# Patient Record
Sex: MALE | Race: OTHER | Employment: OTHER | URBAN - METROPOLITAN AREA
[De-identification: names, ages, dates, MRNs, and addresses within clinical notes are randomized per-mention and may not be internally consistent; named-entity substitution may affect disease eponyms.]

---

## 2023-02-26 ENCOUNTER — APPOINTMENT (OUTPATIENT)
Dept: CT IMAGING | Age: 48
End: 2023-02-26
Attending: EMERGENCY MEDICINE
Payer: COMMERCIAL

## 2023-02-26 ENCOUNTER — HOSPITAL ENCOUNTER (EMERGENCY)
Age: 48
Discharge: HOME | End: 2023-02-26
Attending: EMERGENCY MEDICINE
Payer: COMMERCIAL

## 2023-02-26 VITALS
HEART RATE: 98 BPM | BODY MASS INDEX: 28.61 KG/M2 | OXYGEN SATURATION: 99 % | SYSTOLIC BLOOD PRESSURE: 154 MMHG | WEIGHT: 204.37 LBS | HEIGHT: 71 IN | DIASTOLIC BLOOD PRESSURE: 99 MMHG | RESPIRATION RATE: 16 BRPM | TEMPERATURE: 98.7 F

## 2023-02-26 DIAGNOSIS — N17.9 AKI (ACUTE KIDNEY INJURY) (HCC): ICD-10-CM

## 2023-02-26 DIAGNOSIS — R19.7 VOMITING AND DIARRHEA: Primary | ICD-10-CM

## 2023-02-26 DIAGNOSIS — R11.10 VOMITING AND DIARRHEA: Primary | ICD-10-CM

## 2023-02-26 DIAGNOSIS — R73.9 HYPERGLYCEMIA: ICD-10-CM

## 2023-02-26 LAB
ALBUMIN SERPL-MCNC: 3.5 G/DL (ref 3.5–5)
ALBUMIN/GLOB SERPL: 0.8 (ref 1.1–2.2)
ALP SERPL-CCNC: 143 U/L (ref 45–117)
ALT SERPL-CCNC: 24 U/L (ref 12–78)
ANION GAP SERPL CALC-SCNC: 16 MMOL/L (ref 5–15)
AST SERPL-CCNC: 14 U/L (ref 15–37)
BASOPHILS # BLD: 0 K/UL (ref 0–0.1)
BASOPHILS NFR BLD: 0 % (ref 0–1)
BILIRUB SERPL-MCNC: 0.6 MG/DL (ref 0.2–1)
BUN SERPL-MCNC: 21 MG/DL (ref 6–20)
BUN/CREAT SERPL: 14 (ref 12–20)
CALCIUM SERPL-MCNC: 9.4 MG/DL (ref 8.5–10.1)
CHLORIDE SERPL-SCNC: 96 MMOL/L (ref 97–108)
CO2 SERPL-SCNC: 25 MMOL/L (ref 21–32)
CREAT SERPL-MCNC: 1.53 MG/DL (ref 0.7–1.3)
DIFFERENTIAL METHOD BLD: NORMAL
EOSINOPHIL # BLD: 0 K/UL (ref 0–0.4)
EOSINOPHIL NFR BLD: 1 % (ref 0–7)
ERYTHROCYTE [DISTWIDTH] IN BLOOD BY AUTOMATED COUNT: 13.5 % (ref 11.5–14.5)
GLOBULIN SER CALC-MCNC: 4.5 G/DL (ref 2–4)
GLUCOSE SERPL-MCNC: 310 MG/DL (ref 65–100)
HCT VFR BLD AUTO: 41.8 % (ref 36.6–50.3)
HGB BLD-MCNC: 14.4 G/DL (ref 12.1–17)
IMM GRANULOCYTES # BLD AUTO: 0 K/UL (ref 0–0.04)
IMM GRANULOCYTES NFR BLD AUTO: 0 % (ref 0–0.5)
LIPASE SERPL-CCNC: 275 U/L (ref 73–393)
LYMPHOCYTES # BLD: 1.7 K/UL (ref 0.8–3.5)
LYMPHOCYTES NFR BLD: 24 % (ref 12–49)
MCH RBC QN AUTO: 28.7 PG (ref 26–34)
MCHC RBC AUTO-ENTMCNC: 34.4 G/DL (ref 30–36.5)
MCV RBC AUTO: 83.3 FL (ref 80–99)
MONOCYTES # BLD: 0.5 K/UL (ref 0–1)
MONOCYTES NFR BLD: 7 % (ref 5–13)
NEUTS SEG # BLD: 4.9 K/UL (ref 1.8–8)
NEUTS SEG NFR BLD: 67 % (ref 32–75)
NRBC # BLD: 0 K/UL (ref 0–0.01)
NRBC BLD-RTO: 0 PER 100 WBC
PLATELET # BLD AUTO: 243 K/UL (ref 150–400)
PMV BLD AUTO: 11.1 FL (ref 8.9–12.9)
POTASSIUM SERPL-SCNC: 3.9 MMOL/L (ref 3.5–5.1)
PROT SERPL-MCNC: 8 G/DL (ref 6.4–8.2)
RBC # BLD AUTO: 5.02 M/UL (ref 4.1–5.7)
SODIUM SERPL-SCNC: 137 MMOL/L (ref 136–145)
WBC # BLD AUTO: 7.3 K/UL (ref 4.1–11.1)

## 2023-02-26 PROCEDURE — 74177 CT ABD & PELVIS W/CONTRAST: CPT

## 2023-02-26 PROCEDURE — 96361 HYDRATE IV INFUSION ADD-ON: CPT

## 2023-02-26 PROCEDURE — 99285 EMERGENCY DEPT VISIT HI MDM: CPT

## 2023-02-26 PROCEDURE — 36415 COLL VENOUS BLD VENIPUNCTURE: CPT

## 2023-02-26 PROCEDURE — 74011250636 HC RX REV CODE- 250/636: Performed by: EMERGENCY MEDICINE

## 2023-02-26 PROCEDURE — 74011000636 HC RX REV CODE- 636: Performed by: EMERGENCY MEDICINE

## 2023-02-26 PROCEDURE — 85025 COMPLETE CBC W/AUTO DIFF WBC: CPT

## 2023-02-26 PROCEDURE — 74011250637 HC RX REV CODE- 250/637: Performed by: EMERGENCY MEDICINE

## 2023-02-26 PROCEDURE — 80053 COMPREHEN METABOLIC PANEL: CPT

## 2023-02-26 PROCEDURE — 74011000250 HC RX REV CODE- 250: Performed by: EMERGENCY MEDICINE

## 2023-02-26 PROCEDURE — 83690 ASSAY OF LIPASE: CPT

## 2023-02-26 PROCEDURE — 96374 THER/PROPH/DIAG INJ IV PUSH: CPT

## 2023-02-26 RX ORDER — FAMOTIDINE 20 MG/1
20 TABLET, FILM COATED ORAL 2 TIMES DAILY
Qty: 20 TABLET | Refills: 0 | Status: SHIPPED | OUTPATIENT
Start: 2023-02-26 | End: 2023-03-08

## 2023-02-26 RX ORDER — ONDANSETRON 4 MG/1
4 TABLET, ORALLY DISINTEGRATING ORAL
Qty: 12 TABLET | Refills: 0 | Status: SHIPPED | OUTPATIENT
Start: 2023-02-26

## 2023-02-26 RX ORDER — FAMOTIDINE 20 MG/1
20 TABLET, FILM COATED ORAL
Status: COMPLETED | OUTPATIENT
Start: 2023-02-26 | End: 2023-02-26

## 2023-02-26 RX ORDER — DROPERIDOL 2.5 MG/ML
2.5 INJECTION, SOLUTION INTRAMUSCULAR; INTRAVENOUS ONCE
Status: COMPLETED | OUTPATIENT
Start: 2023-02-26 | End: 2023-02-26

## 2023-02-26 RX ADMIN — DROPERIDOL 2.5 MG: 2.5 INJECTION, SOLUTION INTRAMUSCULAR; INTRAVENOUS at 20:18

## 2023-02-26 RX ADMIN — SODIUM CHLORIDE, POTASSIUM CHLORIDE, SODIUM LACTATE AND CALCIUM CHLORIDE 1000 ML: 600; 310; 30; 20 INJECTION, SOLUTION INTRAVENOUS at 20:18

## 2023-02-26 RX ADMIN — ALUMINUM HYDROXIDE, MAGNESIUM HYDROXIDE, AND SIMETHICONE 40 ML: 200; 200; 20 SUSPENSION ORAL at 21:34

## 2023-02-26 RX ADMIN — FAMOTIDINE 20 MG: 20 TABLET, FILM COATED ORAL at 21:34

## 2023-02-26 RX ADMIN — IOPAMIDOL 100 ML: 755 INJECTION, SOLUTION INTRAVENOUS at 20:33

## 2023-02-27 NOTE — ED PROVIDER NOTES
47M w/ hx gastroparesis and DM p/w 2d abd pain. Pt reports 2d constant and diffuse sharp abd pain w/ multiple episodes of nonbloody vomiting and diarrhea. Pt attributes this to eating short ribs at [employer] 2 nights ago during which time was also drinking etoh. No drugs. No F/C, cough, CP/SOB. NO dizziness or syncope. No urinary symptoms. Multiple prior abd surgeries including cholecystectomy and perforated ulcers. Past Medical History:   Diagnosis Date    Gastroparesis        Past Surgical History:   Procedure Laterality Date    HX CHOLECYSTECTOMY           History reviewed. No pertinent family history. Social History     Socioeconomic History    Marital status: Not on file     Spouse name: Not on file    Number of children: Not on file    Years of education: Not on file    Highest education level: Not on file   Occupational History    Not on file   Tobacco Use    Smoking status: Some Days     Types: Cigarettes    Smokeless tobacco: Never   Substance and Sexual Activity    Alcohol use: Not Currently    Drug use: Never    Sexual activity: Not on file   Other Topics Concern    Not on file   Social History Narrative    Not on file     Social Determinants of Health     Financial Resource Strain: Not on file   Food Insecurity: Not on file   Transportation Needs: Not on file   Physical Activity: Not on file   Stress: Not on file   Social Connections: Not on file   Intimate Partner Violence: Not on file   Housing Stability: Not on file         ALLERGIES: Penicillins    Review of Systems   Constitutional:  Negative for chills, diaphoresis and fever. HENT:  Negative for facial swelling, mouth sores, nosebleeds, trouble swallowing and voice change. Eyes:  Negative for pain and visual disturbance. Respiratory:  Negative for apnea, cough, shortness of breath, wheezing and stridor. Cardiovascular:  Negative for chest pain, palpitations and leg swelling.    Gastrointestinal:  Positive for abdominal pain, diarrhea, nausea and vomiting. Negative for abdominal distention and blood in stool. Genitourinary:  Negative for difficulty urinating, dysuria, flank pain, hematuria, scrotal swelling and testicular pain. Musculoskeletal:  Negative for joint swelling. Skin:  Negative for color change and rash. Allergic/Immunologic: Negative for immunocompromised state. Neurological:  Negative for dizziness, syncope and light-headedness. Hematological:  Does not bruise/bleed easily. Psychiatric/Behavioral:  Negative for agitation and behavioral problems. Vitals:    02/26/23 1949   BP: (!) 156/100   Pulse: (!) 102   Resp: 20   Temp: 98.7 °F (37.1 °C)   SpO2: 100%            Physical Exam  Vitals and nursing note reviewed. Constitutional:       General: He is not in acute distress. Appearance: Normal appearance. He is not ill-appearing or toxic-appearing. HENT:      Head: Normocephalic and atraumatic. Right Ear: External ear normal.      Left Ear: External ear normal.      Nose: Nose normal.      Mouth/Throat:      Mouth: Mucous membranes are moist.      Pharynx: Oropharynx is clear. No oropharyngeal exudate or posterior oropharyngeal erythema. Eyes:      General: No scleral icterus. Extraocular Movements: Extraocular movements intact. Conjunctiva/sclera: Conjunctivae normal.      Pupils: Pupils are equal, round, and reactive to light. Cardiovascular:      Rate and Rhythm: Normal rate and regular rhythm. Pulses: Normal pulses. Heart sounds: No murmur heard. No friction rub. No gallop. Pulmonary:      Effort: Pulmonary effort is normal. No respiratory distress. Breath sounds: Normal breath sounds. No stridor. No wheezing, rhonchi or rales. Abdominal:      General: Bowel sounds are normal. There is no distension. Palpations: Abdomen is soft. Tenderness: There is generalized abdominal tenderness. There is no guarding or rebound.    Genitourinary:     Comments: no testicular/penile erythema/edema/tenderness, no external lesions/ulcers identified  perineum clear  no inguinal hernias or lymphadenopathy appreciated    Musculoskeletal:         General: No deformity. Normal range of motion. Cervical back: Normal range of motion and neck supple. No rigidity. Right lower leg: No edema. Left lower leg: No edema. Skin:     General: Skin is warm. Capillary Refill: Capillary refill takes less than 2 seconds. Coloration: Skin is not jaundiced. Neurological:      General: No focal deficit present. Mental Status: He is alert. Cranial Nerves: No cranial nerve deficit. Sensory: No sensory deficit. Motor: No weakness. Coordination: Coordination normal.   Psychiatric:         Mood and Affect: Mood normal.         Behavior: Behavior normal.         Thought Content: Thought content normal.         Judgment: Judgment normal.      I personally reviewed and independently interpreted EKG, labs and imaging results.     LABORATORY TESTS:  Admission on 02/26/2023, Discharged on 02/26/2023   Component Date Value Ref Range Status    WBC 02/26/2023 7.3  4.1 - 11.1 K/uL Final    RBC 02/26/2023 5.02  4.10 - 5.70 M/uL Final    HGB 02/26/2023 14.4  12.1 - 17.0 g/dL Final    HCT 02/26/2023 41.8  36.6 - 50.3 % Final    MCV 02/26/2023 83.3  80.0 - 99.0 FL Final    MCH 02/26/2023 28.7  26.0 - 34.0 PG Final    MCHC 02/26/2023 34.4  30.0 - 36.5 g/dL Final    RDW 02/26/2023 13.5  11.5 - 14.5 % Final    PLATELET 07/66/7188 957  150 - 400 K/uL Final    MPV 02/26/2023 11.1  8.9 - 12.9 FL Final    NRBC 02/26/2023 0.0  0.0  WBC Final    ABSOLUTE NRBC 02/26/2023 0.00  0.00 - 0.01 K/uL Final    NEUTROPHILS 02/26/2023 67  32 - 75 % Final    LYMPHOCYTES 02/26/2023 24  12 - 49 % Final    MONOCYTES 02/26/2023 7  5 - 13 % Final    EOSINOPHILS 02/26/2023 1  0 - 7 % Final    BASOPHILS 02/26/2023 0  0 - 1 % Final    IMMATURE GRANULOCYTES 02/26/2023 0  0 - 0.5 % Final ABS. NEUTROPHILS 02/26/2023 4.9  1.8 - 8.0 K/UL Final    ABS. LYMPHOCYTES 02/26/2023 1.7  0.8 - 3.5 K/UL Final    ABS. MONOCYTES 02/26/2023 0.5  0.0 - 1.0 K/UL Final    ABS. EOSINOPHILS 02/26/2023 0.0  0.0 - 0.4 K/UL Final    ABS. BASOPHILS 02/26/2023 0.0  0.0 - 0.1 K/UL Final    ABS. IMM. GRANS. 02/26/2023 0.0  0.00 - 0.04 K/UL Final    DF 02/26/2023 AUTOMATED    Final    Sodium 02/26/2023 137  136 - 145 mmol/L Final    Potassium 02/26/2023 3.9  3.5 - 5.1 mmol/L Final    Chloride 02/26/2023 96 (A)  97 - 108 mmol/L Final    CO2 02/26/2023 25  21 - 32 mmol/L Final    Anion gap 02/26/2023 16 (A)  5 - 15 mmol/L Final    Glucose 02/26/2023 310 (A)  65 - 100 mg/dL Final    BUN 02/26/2023 21 (A)  6 - 20 MG/DL Final    Creatinine 02/26/2023 1.53 (A)  0.70 - 1.30 MG/DL Final    BUN/Creatinine ratio 02/26/2023 14  12 - 20   Final    eGFR 02/26/2023 56 (A)  >60 ml/min/1.73m2 Final    Comment:      Pediatric calculator link: [email or web address] [email or web address]/kdoqi/gfr_[email or web address]       These results are not intended for use in patients <25years of age. eGFR results are calculated without a race factor using  the 2021 CKD-EPI equation. Careful clinical correlation is recommended, particularly when comparing to results calculated using previous equations. The CKD-EPI equation is less accurate in patients with extremes of muscle mass, extra-renal metabolism of creatinine, excessive creatine ingestion, or following therapy that affects renal tubular secretion. Calcium 02/26/2023 9.4  8.5 - 10.1 MG/DL Final    Bilirubin, total 02/26/2023 0.6  0.2 - 1.0 MG/DL Final    ALT (SGPT) 02/26/2023 24  12 - 78 U/L Final    AST (SGOT) 02/26/2023 14 (A)  15 - 37 U/L Final    Alk.  phosphatase 02/26/2023 143 (A)  45 - 117 U/L Final    Protein, total 02/26/2023 8.0  6.4 - 8.2 g/dL Final    Albumin 02/26/2023 3.5  3.5 - 5.0 g/dL Final    Globulin 02/26/2023 4.5 (A)  2.0 - 4.0 g/dL Final    A-G Ratio 02/26/2023 0.8 (A)  1.1 - 2.2 Final    Lipase 02/26/2023 275  73 - 393 U/L Final       IMAGING RESULTS:  CT ABD PELV W CONT   Final Result   No acute intraperitoneal process is identified. Imaging findings suggestive of a chronic inflammatory colitis. No acute   exacerbation identified. Incidental and/or nonemergent findings are as described   above. MEDICATIONS GIVEN:  Medications   droperidoL (INAPSINE) injection 2.5 mg (2.5 mg IntraVENous Given 2/26/23 2018)   lactated ringers bolus infusion 1,000 mL (0 mL IntraVENous IV Completed 2/26/23 2140)   iopamidoL (ISOVUE-370) 370 mg iodine /mL (76 %) injection 100 mL (100 mL IntraVENous Given 2/26/23 2033)   mylanta/viscous lidocaine (GI COCKTAIL) (40 mL Oral Given 2/26/23 2134)   famotidine (PEPCID) tablet 20 mg (20 mg Oral Given 2/26/23 2134)       IMPRESSION:  1. Vomiting and diarrhea    2. EMA (acute kidney injury) ([facility] [address]    3. Hyperglycemia        PLAN:  - Discharge    Juan C Swartz MD      Medical Decision Making  86A w/ hx gastroparesis and DM p/w 2d abd pain and N/V/D. Pt nontoxic appearing, afebrile, hemodynamically stable w/o resp distress or hypoxia. Mild diffuse abd tenderness w/o peritonitis and normal  exam. Ddx includes appendicitis vs pyelo/UTI vs kidney stone vs acute cholecystitis/choledocholithiasis/cholangitis vs diverticulitis/colitis vs obstruction vs volvulus/intussusception vs incarcerated/strangulated hernia vs pancreatitis vs PUD vs gastritis, less likely ACS, AAA or mesenteric ischemia/ischemic bowel based on presentation. Ordered CTAP and labs. Give IVF and droperidol. Monitor and reassess. 2200 Pt remains stable, sleeping and feeling better. Labs show mild EMA for which given IVF. No leukocytosis and normal LFTs/lipase. Has hyperglycemia w/o evidence of DKA. CTAP neg for acute findings. Low concern for acute abd process. Scripts for zofran and pepcid.     Patient given specific return precautions and explained signs/symptoms for which to come back to ED immediately but otherwise advised to f/u w/ PCP over next 2-3days. Amount and/or Complexity of Data Reviewed  Independent Historian: spouse  Labs: ordered. Decision-making details documented in ED Course. Radiology: ordered and independent interpretation performed. Decision-making details documented in ED Course. ECG/medicine tests: ordered and independent interpretation performed. Decision-making details documented in ED Course. Risk  Prescription drug management.            Procedures

## 2023-02-27 NOTE — ED TRIAGE NOTES
Arrives with c/o of generalized abd pain with n/v x 2. Reports that he ate at [facility] and had short ribs. Went to [facility], had UA, and referred to ED. Last episode of emesis around 4pm. Last bm around 7pm. Denies fevers. Hx of gastroparesis and has had gallbladder removed.      PCP: Anaya Mcgregor, [place].